# Patient Record
(demographics unavailable — no encounter records)

---

## 2025-06-26 NOTE — HISTORY OF PRESENT ILLNESS
[FreeTextEntry6] : 1 - sore throat - for a few days - decreased po intake however trying to hydrate well - meds: robutussin prn - no cough  2 - LEFT nipple lump  - for a few days - no discharge, no pain however palpable lump   no fever above 100.4F, vomiting, diarrhea, uri symptoms, sob or difficulty breathing, joint pain or swelling, rash, urinary symptoms, headaches, ear pain or tugging

## 2025-06-26 NOTE — PHYSICAL EXAM
[Erythematous Oropharynx] : erythematous oropharynx [Mass] : mass palpable [Normal Appearance] : normal appearance [Freddy: ____] : Freddy [unfilled] [Circumcised] : circumcised [Enlarged] : enlarged [Anterior Cervical] : anterior cervical [NL] : warm, clear [Gynecomastia] : no gynecomastia [Nipple Discharge] : no nipple discharge

## 2025-06-26 NOTE — DISCUSSION/SUMMARY
[FreeTextEntry1] : Strep Pharyngitis: rapid strep positive. start on antibiotics as directed. Recommend supportive care including antipyretics, fluids, OTC cough/cold medications if age-appropriate, and nasal saline followed by nasal suction. Return to clinic or ED if symptoms worsen or persist. After being on antibiotics for at least 24 hours patient less likely to spread infection   Breast bud: within normal realm of pubertal changes. do not continue to palpate or pinch. if with rapid growth, nipple discharge, worsening pain, rtc for reassessment. continue to monitor, will reassess at next cpe  Caretaker expressed understanding of the plan and agrees. No other concerns or questions today.